# Patient Record
Sex: FEMALE | Race: WHITE | NOT HISPANIC OR LATINO
[De-identification: names, ages, dates, MRNs, and addresses within clinical notes are randomized per-mention and may not be internally consistent; named-entity substitution may affect disease eponyms.]

---

## 2017-01-13 ENCOUNTER — APPOINTMENT (OUTPATIENT)
Dept: UROGYNECOLOGY | Facility: CLINIC | Age: 42
End: 2017-01-13

## 2017-01-13 RX ORDER — CLARITHROMYCIN 500 MG/1
500 TABLET, FILM COATED ORAL
Qty: 20 | Refills: 0 | Status: DISCONTINUED | COMMUNITY
Start: 2016-12-16

## 2017-01-18 ENCOUNTER — APPOINTMENT (OUTPATIENT)
Dept: UROGYNECOLOGY | Facility: CLINIC | Age: 42
End: 2017-01-18

## 2017-01-31 ENCOUNTER — APPOINTMENT (OUTPATIENT)
Dept: UROGYNECOLOGY | Facility: CLINIC | Age: 42
End: 2017-01-31

## 2017-02-15 ENCOUNTER — APPOINTMENT (OUTPATIENT)
Dept: UROGYNECOLOGY | Facility: CLINIC | Age: 42
End: 2017-02-15

## 2017-03-15 ENCOUNTER — APPOINTMENT (OUTPATIENT)
Dept: UROGYNECOLOGY | Facility: CLINIC | Age: 42
End: 2017-03-15

## 2017-09-24 ENCOUNTER — TRANSCRIPTION ENCOUNTER (OUTPATIENT)
Age: 42
End: 2017-09-24

## 2018-01-30 ENCOUNTER — RESULT REVIEW (OUTPATIENT)
Age: 43
End: 2018-01-30

## 2018-11-07 ENCOUNTER — TRANSCRIPTION ENCOUNTER (OUTPATIENT)
Age: 43
End: 2018-11-07

## 2018-11-24 ENCOUNTER — TRANSCRIPTION ENCOUNTER (OUTPATIENT)
Age: 43
End: 2018-11-24

## 2019-01-10 ENCOUNTER — RESULT REVIEW (OUTPATIENT)
Age: 44
End: 2019-01-10

## 2019-02-08 ENCOUNTER — APPOINTMENT (OUTPATIENT)
Dept: UROGYNECOLOGY | Facility: CLINIC | Age: 44
End: 2019-02-08
Payer: COMMERCIAL

## 2019-02-08 DIAGNOSIS — R39.13 SPLITTING OF URINARY STREAM: ICD-10-CM

## 2019-02-08 DIAGNOSIS — N81.6 RECTOCELE: ICD-10-CM

## 2019-02-08 DIAGNOSIS — R15.0 INCOMPLETE DEFECATION: ICD-10-CM

## 2019-02-08 PROCEDURE — 99214 OFFICE O/P EST MOD 30 MIN: CPT | Mod: 25

## 2019-02-08 PROCEDURE — 51798 US URINE CAPACITY MEASURE: CPT

## 2019-02-08 NOTE — PHYSICAL EXAM
[No Acute Distress] : in no acute distress [Well developed] : well developed [Well Nourished] : ~L well nourished [Good Hygeine] : demonstrates good hygeine [Normal Memory] : ~T memory was ~L unimpaired [Normal Mood/Affect] : mood and affect are normal [Warm and Dry] : was warm and dry to touch [Normal Gait] : gait was normal [Labia Majora] : were normal [Labia Minora] : were normal [Normal Appearance] : general appearance was normal [No Bleeding] : there was no active vaginal bleeding [Aa ____] : Aa [unfilled] [Ba ____] : Ba [unfilled] [C ____] : C [unfilled] [GH ____] : GH [unfilled] [PB ____] : PB [unfilled] [TVL ____] : TVL  [unfilled] [Ap ____] : Ap [unfilled] [Bp ____] : Bp [unfilled] [D ____] : D [unfilled] [] : II [Normal] : normal [Uterine Adnexae] : were not tender and not enlarged [Anxiety] : patient is not anxious [Tenderness] : ~T no ~M abdominal tenderness observed [Distended] : not distended [H/Smegaly] : no hepatosplenomegaly [Inguinal LAD] : no adenopathy was noted in the inguinal lymph nodes [de-identified] : Rectocele to introitus extending down length of posterior vagina

## 2019-02-08 NOTE — HISTORY OF PRESENT ILLNESS
[Cystocele (Obstetric)] : daily [Uterine Prolapse] : daily [Rectal Prolapse] : daily [Unable To Restrain Bowel Movement] : rare [Urinary Frequency] : none [Feelings Of Urinary Urgency] : rare [Urinary Frequency More Than Twice At Night (Nocturia)] : none [Urinary Tract Infection] : rare [Constipation Obstructed Defecation] : none [Incomplete Emptying Of Stool] : none [] : months ago [Stool Visible Blood] : rare [FreeTextEntry3] : for BMs [de-identified] : cough, sneeze [de-identified] : splints [de-identified] : often splints for BMs but not always [FreeTextEntry1] : \par Tamia is a 42yo P2 who presents for evaluation for prolapse. Reports feeling pressure in the vagina and needing to splint occasionally for BMs for the past 6 months. Was previously seen for UUI, which has resolved. Does occasionally have urgency and ADONAY with cough and sneeze. This is not bothersome to her. Denies constipation and straining. 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

## 2019-02-08 NOTE — DISCUSSION/SUMMARY
[Discuss Alternatives/Risks/Benefits w/Patient] : All alternatives, risks, and benefits were discussed with the patient/family and all questions were answered.  Patient expressed good understanding and appreciates the importance of follow up as recommended. 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 Management options include continued observation, PFMT, pessary, and surgery. We discussed surgical options of a posterior repair alone with the associated risks of post-operative pain and dyspareunia. We discussed the findings of apical prolapse and recommended that if surgery is chosen, she have a comprehensive approach with supracervical hysterectomy, ASC, with possible posterior repair at time of surgery or later if necessary. Patient is uncertain at this time regarding surgery and would like to schedule an appointment for biofeedback/PFMT. She will consider surgical options. If she decided to move towards surgery, she will need to return for UDS testing due to h/o SVETLANA. She is unable to complete simple CMG in office due to difficult catheterization/discomfort demonstrated back in 2016 with her first visit. Pt expressed understanding, and all questions were answered.

## 2019-03-10 ENCOUNTER — TRANSCRIPTION ENCOUNTER (OUTPATIENT)
Age: 44
End: 2019-03-10

## 2019-03-22 ENCOUNTER — APPOINTMENT (OUTPATIENT)
Dept: UROGYNECOLOGY | Facility: CLINIC | Age: 44
End: 2019-03-22
Payer: COMMERCIAL

## 2019-03-22 DIAGNOSIS — R39.15 URGENCY OF URINATION: ICD-10-CM

## 2019-03-22 PROCEDURE — 99214 OFFICE O/P EST MOD 30 MIN: CPT

## 2019-04-05 ENCOUNTER — APPOINTMENT (OUTPATIENT)
Dept: UROGYNECOLOGY | Facility: CLINIC | Age: 44
End: 2019-04-05
Payer: COMMERCIAL

## 2019-04-05 DIAGNOSIS — N39.3 STRESS INCONTINENCE (FEMALE) (MALE): ICD-10-CM

## 2019-04-05 DIAGNOSIS — R33.9 RETENTION OF URINE, UNSPECIFIED: ICD-10-CM

## 2019-04-05 DIAGNOSIS — N81.2 INCOMPLETE UTEROVAGINAL PROLAPSE: ICD-10-CM

## 2019-04-05 DIAGNOSIS — N81.4 UTEROVAGINAL PROLAPSE, UNSPECIFIED: ICD-10-CM

## 2019-04-05 PROCEDURE — 99214 OFFICE O/P EST MOD 30 MIN: CPT

## 2019-05-06 ENCOUNTER — APPOINTMENT (OUTPATIENT)
Dept: UROGYNECOLOGY | Facility: CLINIC | Age: 44
End: 2019-05-06
Payer: COMMERCIAL

## 2019-05-06 DIAGNOSIS — N81.89 OTHER FEMALE GENITAL PROLAPSE: ICD-10-CM

## 2019-05-06 PROCEDURE — 99214 OFFICE O/P EST MOD 30 MIN: CPT

## 2019-06-17 ENCOUNTER — APPOINTMENT (OUTPATIENT)
Dept: UROGYNECOLOGY | Facility: CLINIC | Age: 44
End: 2019-06-17

## 2019-10-09 ENCOUNTER — APPOINTMENT (OUTPATIENT)
Dept: SURGICAL ONCOLOGY | Facility: CLINIC | Age: 44
End: 2019-10-09
Payer: COMMERCIAL

## 2019-10-09 VITALS
RESPIRATION RATE: 16 BRPM | SYSTOLIC BLOOD PRESSURE: 122 MMHG | BODY MASS INDEX: 20.4 KG/M2 | WEIGHT: 130 LBS | TEMPERATURE: 97.9 F | DIASTOLIC BLOOD PRESSURE: 79 MMHG | OXYGEN SATURATION: 100 % | HEART RATE: 57 BPM | HEIGHT: 67 IN

## 2019-10-09 DIAGNOSIS — N60.99 UNSPECIFIED BENIGN MAMMARY DYSPLASIA OF UNSPECIFIED BREAST: ICD-10-CM

## 2019-10-09 PROCEDURE — 99214 OFFICE O/P EST MOD 30 MIN: CPT

## 2019-10-09 RX ORDER — NITROFURANTOIN (MONOHYDRATE/MACROCRYSTALS) 25; 75 MG/1; MG/1
100 CAPSULE ORAL
Qty: 14 | Refills: 0 | Status: ACTIVE | COMMUNITY
Start: 2019-07-26

## 2019-12-17 ENCOUNTER — FORM ENCOUNTER (OUTPATIENT)
Age: 44
End: 2019-12-17

## 2019-12-18 ENCOUNTER — APPOINTMENT (OUTPATIENT)
Dept: MRI IMAGING | Facility: IMAGING CENTER | Age: 44
End: 2019-12-18
Payer: COMMERCIAL

## 2019-12-18 ENCOUNTER — OUTPATIENT (OUTPATIENT)
Dept: OUTPATIENT SERVICES | Facility: HOSPITAL | Age: 44
LOS: 1 days | End: 2019-12-18
Payer: COMMERCIAL

## 2019-12-18 DIAGNOSIS — Z91.89 OTHER SPECIFIED PERSONAL RISK FACTORS, NOT ELSEWHERE CLASSIFIED: ICD-10-CM

## 2019-12-18 PROCEDURE — C8908: CPT

## 2019-12-18 PROCEDURE — C8937: CPT

## 2019-12-18 PROCEDURE — A9585: CPT

## 2019-12-18 PROCEDURE — 77049 MRI BREAST C-+ W/CAD BI: CPT | Mod: 26

## 2020-01-06 NOTE — REASON FOR VISIT
[Follow-Up Visit] : a follow-up visit for [Other: _____] : [unfilled] [FreeTextEntry2] : interval breast exam

## 2020-01-06 NOTE — HISTORY OF PRESENT ILLNESS
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 Dominic ARANDA\par \par No significant past medical history.\par No prescription medications.\par \par Her gynecologist is Dr. Shilpa CENTENO\par January 2019 Pap smear was unremarkable\par Her on genetic testing ([employer]) identified no mutations which create a posterior breast or ovarian cancer

## 2020-01-06 NOTE — PHYSICAL EXAM
[Normal] : supple, no neck mass and thyroid not enlarged [Normal Neck Lymph Nodes] : normal neck lymph nodes  [Normal Supraclavicular Lymph Nodes] : normal supraclavicular lymph nodes [Normal Axillary Lymph Nodes] : normal axillary lymph nodes [Normal] : normal appearance, no rash, nodules, vesicles, ulcers, erythema [de-identified] : groins not examined [de-identified] : Below

## 2020-01-06 NOTE — ASSESSMENT
[FreeTextEntry1] : July 2019 bilateral mammogram and sonogram @[facility]: BI-RADS-1\par Annual followup recommended, prescription provided for July 2020.\par \par Introduced the suggestion of a baseline breast MRI, breast cancer risk score: 24.4\par She agrees, we'll perform baseline in December 2019, prescription entered\par \par Clinically appears to be doing well.\par \par We will continue annual followup, sooner if needed\par \par \par 12-18-19.\par I called her but had to leave a voice mail.\par Her baseline breast MRI performed earlier today at [facility]: BI-RADS 3.\par Equivocal right breast findings, likely corresponding to a stable nodule seen on previous ultrasound for which 6 month followup is recommended in June 2020, prescription entered today\par \par 12-19-19.\par I returned her call but had to leave a voicemail.

## 2020-06-19 ENCOUNTER — RESULT REVIEW (OUTPATIENT)
Age: 45
End: 2020-06-19

## 2020-06-19 ENCOUNTER — APPOINTMENT (OUTPATIENT)
Dept: MRI IMAGING | Facility: IMAGING CENTER | Age: 45
End: 2020-06-19
Payer: COMMERCIAL

## 2020-06-19 ENCOUNTER — OUTPATIENT (OUTPATIENT)
Dept: OUTPATIENT SERVICES | Facility: HOSPITAL | Age: 45
LOS: 1 days | End: 2020-06-19
Payer: COMMERCIAL

## 2020-06-19 DIAGNOSIS — Z91.89 OTHER SPECIFIED PERSONAL RISK FACTORS, NOT ELSEWHERE CLASSIFIED: ICD-10-CM

## 2020-06-19 PROCEDURE — C8908: CPT

## 2020-06-19 PROCEDURE — C8937: CPT

## 2020-06-19 PROCEDURE — 77049 MRI BREAST C-+ W/CAD BI: CPT | Mod: 26

## 2020-06-19 PROCEDURE — A9585: CPT

## 2020-07-23 ENCOUNTER — RESULT REVIEW (OUTPATIENT)
Age: 45
End: 2020-07-23

## 2020-10-05 ENCOUNTER — APPOINTMENT (OUTPATIENT)
Dept: SURGICAL ONCOLOGY | Facility: CLINIC | Age: 45
End: 2020-10-05

## 2020-10-05 NOTE — ASSESSMENT
[FreeTextEntry1] : December 2019:\par Baseline breast MRI: BI-RADS 3.\par \par June 2020:\par Follow-up breast MRI at 450: BI-RADS 2.\par We will repeat periodically, balancing her increased risk of breast cancer, against the concern of cumulative gadolinium exposure.\par \par July 2020 mammogram and sonogram at [facility].................. \par \par 10/5/20, she did not keep her appointment for followup of her Increased risk of breast cancer

## 2020-10-05 NOTE — HISTORY OF PRESENT ILLNESS
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 Dominic ARANDA\par \par No significant past medical history.\par No prescription medications.\par \par \par Her gynecologist is Dr. Shilpa CENTENO\par January 2019 Pap smear was unremarkable\par Her genetic testing ([employer]) identified no mutations which would predispose her to breast or ovarian cancer\par \par \par Colonoscopy will be at age 52

## 2020-10-05 NOTE — PHYSICAL EXAM
[Normal] : supple, no neck mass and thyroid not enlarged [Normal Neck Lymph Nodes] : normal neck lymph nodes  [Normal Supraclavicular Lymph Nodes] : normal supraclavicular lymph nodes [Normal Axillary Lymph Nodes] : normal axillary lymph nodes [Normal] : normal appearance, no rash, nodules, vesicles, ulcers, erythema [de-identified] : groins not examined [de-identified] : Below

## 2021-01-20 ENCOUNTER — RESULT REVIEW (OUTPATIENT)
Age: 46
End: 2021-01-20

## 2021-01-20 ENCOUNTER — APPOINTMENT (OUTPATIENT)
Dept: MAMMOGRAPHY | Facility: HOSPITAL | Age: 46
End: 2021-01-20
Payer: COMMERCIAL

## 2021-01-20 ENCOUNTER — APPOINTMENT (OUTPATIENT)
Dept: ULTRASOUND IMAGING | Facility: HOSPITAL | Age: 46
End: 2021-01-20
Payer: COMMERCIAL

## 2021-01-20 ENCOUNTER — OUTPATIENT (OUTPATIENT)
Dept: OUTPATIENT SERVICES | Facility: HOSPITAL | Age: 46
LOS: 1 days | End: 2021-01-20
Payer: COMMERCIAL

## 2021-01-20 DIAGNOSIS — Z00.8 ENCOUNTER FOR OTHER GENERAL EXAMINATION: ICD-10-CM

## 2021-01-20 PROCEDURE — 76641 ULTRASOUND BREAST COMPLETE: CPT | Mod: 26,50

## 2021-01-20 PROCEDURE — 77067 SCR MAMMO BI INCL CAD: CPT | Mod: 26

## 2021-01-20 PROCEDURE — 77063 BREAST TOMOSYNTHESIS BI: CPT | Mod: 26

## 2021-01-20 PROCEDURE — 76641 ULTRASOUND BREAST COMPLETE: CPT

## 2021-01-20 PROCEDURE — 77067 SCR MAMMO BI INCL CAD: CPT

## 2021-01-20 PROCEDURE — 77063 BREAST TOMOSYNTHESIS BI: CPT

## 2021-02-02 ENCOUNTER — APPOINTMENT (OUTPATIENT)
Dept: ULTRASOUND IMAGING | Facility: IMAGING CENTER | Age: 46
End: 2021-02-02

## 2021-02-02 ENCOUNTER — APPOINTMENT (OUTPATIENT)
Dept: MAMMOGRAPHY | Facility: IMAGING CENTER | Age: 46
End: 2021-02-02

## 2021-02-25 ENCOUNTER — TRANSCRIPTION ENCOUNTER (OUTPATIENT)
Age: 46
End: 2021-02-25

## 2021-03-04 ENCOUNTER — APPOINTMENT (OUTPATIENT)
Dept: OBGYN | Facility: CLINIC | Age: 46
End: 2021-03-04
Payer: COMMERCIAL

## 2021-03-04 DIAGNOSIS — R61 GENERALIZED HYPERHIDROSIS: ICD-10-CM

## 2021-03-04 PROCEDURE — 99442: CPT | Mod: 25,95

## 2021-04-08 ENCOUNTER — NON-APPOINTMENT (OUTPATIENT)
Age: 46
End: 2021-04-08

## 2021-04-08 DIAGNOSIS — R51.9 HEADACHE, UNSPECIFIED: ICD-10-CM

## 2021-04-08 LAB
BASOPHILS # BLD AUTO: 0.04 K/UL
BASOPHILS NFR BLD AUTO: 0.7 %
EOSINOPHIL # BLD AUTO: 0.32 K/UL
EOSINOPHIL NFR BLD AUTO: 5.7 %
ESTIMATED AVERAGE GLUCOSE: 103 MG/DL
ESTRADIOL SERPL-MCNC: <5 PG/ML
FSH SERPL-MCNC: 4.5 IU/L
HBA1C MFR BLD HPLC: 5.2 %
HCT VFR BLD CALC: 43.1 %
HGB BLD-MCNC: 13.8 G/DL
IMM GRANULOCYTES NFR BLD AUTO: 0.2 %
LH SERPL-ACNC: 4.2 IU/L
LYMPHOCYTES # BLD AUTO: 1.96 K/UL
LYMPHOCYTES NFR BLD AUTO: 35.1 %
MAN DIFF?: NORMAL
MCHC RBC-ENTMCNC: 31.9 PG
MCHC RBC-ENTMCNC: 32 GM/DL
MCV RBC AUTO: 99.8 FL
MONOCYTES # BLD AUTO: 0.37 K/UL
MONOCYTES NFR BLD AUTO: 6.6 %
NEUTROPHILS # BLD AUTO: 2.88 K/UL
NEUTROPHILS NFR BLD AUTO: 51.7 %
PLATELET # BLD AUTO: 222 K/UL
RBC # BLD: 4.32 M/UL
RBC # FLD: 12.7 %
TSH SERPL-ACNC: 2.14 UIU/ML
WBC # FLD AUTO: 5.58 K/UL

## 2021-04-11 LAB
ALBUMIN SERPL ELPH-MCNC: 4 G/DL
ALP BLD-CCNC: 51 U/L
ALT SERPL-CCNC: 13 U/L
ANION GAP SERPL CALC-SCNC: 18 MMOL/L
AST SERPL-CCNC: 23 U/L
BILIRUB SERPL-MCNC: 0.4 MG/DL
BUN SERPL-MCNC: 12 MG/DL
CALCIUM SERPL-MCNC: 9.6 MG/DL
CHLORIDE SERPL-SCNC: 105 MMOL/L
CO2 SERPL-SCNC: 20 MMOL/L
CREAT SERPL-MCNC: 0.81 MG/DL
GLUCOSE SERPL-MCNC: 83 MG/DL
POTASSIUM SERPL-SCNC: 4.7 MMOL/L
PROT SERPL-MCNC: 6.8 G/DL
SODIUM SERPL-SCNC: 143 MMOL/L

## 2021-05-10 ENCOUNTER — RX RENEWAL (OUTPATIENT)
Age: 46
End: 2021-05-10

## 2021-05-10 RX ORDER — ESTRADIOL 1 MG/1
1 TABLET ORAL
Qty: 30 | Refills: 0 | Status: ACTIVE | COMMUNITY
Start: 2021-04-08 | End: 1900-01-01

## 2021-06-23 DIAGNOSIS — Z30.41 ENCOUNTER FOR SURVEILLANCE OF CONTRACEPTIVE PILLS: ICD-10-CM

## 2021-06-23 RX ORDER — NORGESTIMATE AND ETHINYL ESTRADIOL 0.25-0.035
0.25-35 KIT ORAL
Qty: 1 | Refills: 0 | Status: ACTIVE | COMMUNITY
Start: 2019-01-30 | End: 1900-01-01

## 2021-08-04 ENCOUNTER — RX RENEWAL (OUTPATIENT)
Age: 46
End: 2021-08-04

## 2021-08-06 ENCOUNTER — APPOINTMENT (OUTPATIENT)
Dept: OBGYN | Facility: CLINIC | Age: 46
End: 2021-08-06

## 2021-09-02 RX ORDER — NORGESTIMATE AND ETHINYL ESTRADIOL 0.25-0.035
0.25-35 KIT ORAL
Qty: 28 | Refills: 0 | Status: ACTIVE | COMMUNITY
Start: 2021-08-04 | End: 1900-01-01

## 2021-09-21 ENCOUNTER — APPOINTMENT (OUTPATIENT)
Dept: OBGYN | Facility: CLINIC | Age: 46
End: 2021-09-21
Payer: COMMERCIAL

## 2021-09-21 VITALS
SYSTOLIC BLOOD PRESSURE: 108 MMHG | WEIGHT: 134 LBS | BODY MASS INDEX: 21.03 KG/M2 | HEIGHT: 67 IN | DIASTOLIC BLOOD PRESSURE: 70 MMHG

## 2021-09-21 DIAGNOSIS — G43.909 MIGRAINE, UNSPECIFIED, NOT INTRACTABLE, W/OUT STATUS MIGRAINOSUS: ICD-10-CM

## 2021-09-21 DIAGNOSIS — Z30.9 ENCOUNTER FOR CONTRACEPTIVE MANAGEMENT, UNSPECIFIED: ICD-10-CM

## 2021-09-21 PROCEDURE — 99396 PREV VISIT EST AGE 40-64: CPT

## 2021-09-21 PROCEDURE — 82270 OCCULT BLOOD FECES: CPT

## 2021-09-21 RX ORDER — LACTIC ACID, L-, CITRIC ACID MONOHYDRATE, AND POTASSIUM BITARTRATE 90; 50; 20 MG/5G; MG/5G; MG/5G
1.8-1-0.4 GEL VAGINAL
Qty: 1 | Refills: 3 | Status: ACTIVE | COMMUNITY
Start: 2021-09-21 | End: 1900-01-01

## 2021-09-24 LAB — HPV HIGH+LOW RISK DNA PNL CVX: NOT DETECTED

## 2021-09-27 LAB — CYTOLOGY CVX/VAG DOC THIN PREP: NORMAL

## 2021-11-14 DIAGNOSIS — R39.9 UNSPECIFIED SYMPTOMS AND SIGNS INVOLVING THE GENITOURINARY SYSTEM: ICD-10-CM

## 2021-11-14 RX ORDER — NITROFURANTOIN (MONOHYDRATE/MACROCRYSTALS) 25; 75 MG/1; MG/1
100 CAPSULE ORAL
Qty: 10 | Refills: 0 | Status: ACTIVE | COMMUNITY
Start: 2021-11-14 | End: 1900-01-01

## 2022-02-11 ENCOUNTER — RESULT REVIEW (OUTPATIENT)
Age: 47
End: 2022-02-11

## 2022-02-11 ENCOUNTER — APPOINTMENT (OUTPATIENT)
Dept: MAMMOGRAPHY | Facility: CLINIC | Age: 47
End: 2022-02-11
Payer: COMMERCIAL

## 2022-02-11 ENCOUNTER — APPOINTMENT (OUTPATIENT)
Dept: ULTRASOUND IMAGING | Facility: CLINIC | Age: 47
End: 2022-02-11
Payer: COMMERCIAL

## 2022-02-11 PROCEDURE — 76641 ULTRASOUND BREAST COMPLETE: CPT | Mod: 50

## 2022-02-11 PROCEDURE — 77067 SCR MAMMO BI INCL CAD: CPT

## 2022-02-11 PROCEDURE — 77063 BREAST TOMOSYNTHESIS BI: CPT

## 2023-01-18 ENCOUNTER — APPOINTMENT (OUTPATIENT)
Dept: OBGYN | Facility: CLINIC | Age: 48
End: 2023-01-18
Payer: COMMERCIAL

## 2023-01-18 VITALS
WEIGHT: 129 LBS | DIASTOLIC BLOOD PRESSURE: 65 MMHG | BODY MASS INDEX: 20.25 KG/M2 | HEIGHT: 67 IN | SYSTOLIC BLOOD PRESSURE: 111 MMHG

## 2023-01-18 PROCEDURE — 82270 OCCULT BLOOD FECES: CPT

## 2023-01-18 PROCEDURE — 99396 PREV VISIT EST AGE 40-64: CPT

## 2023-01-23 LAB
CYTOLOGY CVX/VAG DOC THIN PREP: NORMAL
HPV HIGH+LOW RISK DNA PNL CVX: NOT DETECTED

## 2023-01-26 DIAGNOSIS — R92.2 INCONCLUSIVE MAMMOGRAM: ICD-10-CM

## 2023-02-15 ENCOUNTER — APPOINTMENT (OUTPATIENT)
Dept: ULTRASOUND IMAGING | Facility: CLINIC | Age: 48
End: 2023-02-15
Payer: COMMERCIAL

## 2023-02-15 ENCOUNTER — APPOINTMENT (OUTPATIENT)
Dept: MAMMOGRAPHY | Facility: CLINIC | Age: 48
End: 2023-02-15
Payer: COMMERCIAL

## 2023-02-15 ENCOUNTER — RESULT REVIEW (OUTPATIENT)
Age: 48
End: 2023-02-15

## 2023-02-15 PROCEDURE — 76641 ULTRASOUND BREAST COMPLETE: CPT | Mod: 50

## 2023-02-15 PROCEDURE — 77063 BREAST TOMOSYNTHESIS BI: CPT

## 2023-02-15 PROCEDURE — 77067 SCR MAMMO BI INCL CAD: CPT

## 2023-02-17 ENCOUNTER — OUTPATIENT (OUTPATIENT)
Dept: OUTPATIENT SERVICES | Facility: HOSPITAL | Age: 48
LOS: 1 days | End: 2023-02-17
Payer: COMMERCIAL

## 2023-02-17 ENCOUNTER — APPOINTMENT (OUTPATIENT)
Dept: MRI IMAGING | Facility: IMAGING CENTER | Age: 48
End: 2023-02-17
Payer: COMMERCIAL

## 2023-02-17 DIAGNOSIS — R92.2 INCONCLUSIVE MAMMOGRAM: ICD-10-CM

## 2023-02-17 DIAGNOSIS — Z00.8 ENCOUNTER FOR OTHER GENERAL EXAMINATION: ICD-10-CM

## 2023-02-17 DIAGNOSIS — Z01.419 ENCOUNTER FOR GYNECOLOGICAL EXAMINATION (GENERAL) (ROUTINE) WITHOUT ABNORMAL FINDINGS: ICD-10-CM

## 2023-02-17 PROCEDURE — A9585: CPT

## 2023-02-17 PROCEDURE — 77049 MRI BREAST C-+ W/CAD BI: CPT | Mod: 26

## 2023-02-17 PROCEDURE — C8937: CPT

## 2023-02-17 PROCEDURE — C8908: CPT

## 2023-02-28 ENCOUNTER — NON-APPOINTMENT (OUTPATIENT)
Age: 48
End: 2023-02-28

## 2023-08-01 NOTE — REASON FOR VISIT
[Other: _____] : [unfilled] [FreeTextEntry2] : 10/5/20, she did not keep her appointment for followup of her Increased risk of breast cancer - - -

## 2024-01-23 ENCOUNTER — NON-APPOINTMENT (OUTPATIENT)
Age: 49
End: 2024-01-23

## 2024-02-01 ENCOUNTER — NON-APPOINTMENT (OUTPATIENT)
Age: 49
End: 2024-02-01

## 2024-02-04 PROBLEM — Z91.89 INCREASED RISK OF BREAST CANCER: Status: ACTIVE | Noted: 2019-10-09

## 2024-02-05 ENCOUNTER — APPOINTMENT (OUTPATIENT)
Dept: SURGICAL ONCOLOGY | Facility: CLINIC | Age: 49
End: 2024-02-05
Payer: COMMERCIAL

## 2024-02-05 ENCOUNTER — NON-APPOINTMENT (OUTPATIENT)
Age: 49
End: 2024-02-05

## 2024-02-05 VITALS
WEIGHT: 128 LBS | SYSTOLIC BLOOD PRESSURE: 115 MMHG | RESPIRATION RATE: 16 BRPM | HEIGHT: 67 IN | OXYGEN SATURATION: 96 % | DIASTOLIC BLOOD PRESSURE: 75 MMHG | HEART RATE: 68 BPM | BODY MASS INDEX: 20.09 KG/M2

## 2024-02-05 DIAGNOSIS — Z91.89 OTHER SPECIFIED PERSONAL RISK FACTORS, NOT ELSEWHERE CLASSIFIED: ICD-10-CM

## 2024-02-05 PROCEDURE — 99204 OFFICE O/P NEW MOD 45 MIN: CPT

## 2024-02-05 NOTE — REASON FOR VISIT
[Other: _____] : [unfilled] [FreeTextEntry2] : Reestablish follow-up for increased risk of breast cancer, Tyrer-Cuzick risk score = 39.3

## 2024-02-05 NOTE — HISTORY OF PRESENT ILLNESS
[de-identified] : 48-year-old lady.  She last saw me in October 2019.  We have followed her intermittently for periodic breast examinations since November 2009.  Tyrer-Cuzick risk score = 39.3.  CC: Presently, no signs or symptoms related to either breast.   Had her second baby November 2016. The baby was diagnosed, at birth, as having a deleterious mutation for ALD (adrenal leukodystrophy). He is presently growing and maturing normally and is followed semiannually in [place] ([facility]) with regular brain MRIs. 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 Her neurologist here is Dr. Sobeida ARRIOLA. She often follows up with Dr. Laughlin in [place].   No previous breast biopsies  Her menarche was at age 16. Two pregnancies, both delivered, the first at age 37.  +FH: Her maternal grandmother had breast cancer. TWO maternal great aunts had breast cancer also.  No relatives with ovarian cancer.  Not Ashkenazi.  + Additional family history of malignancy: Mother: AML.   Breast cancer risk score, calculated October 9, 2019: 24.4   PMD: Dr. Giselle DIEHL. She used to see Dr. Dominic Henderson.  + ALLERGIC: Penicillin.  No significant past medical history.  She takes Wellbutrin and Celexa. Prescribed by Dr. Kwasi FROST.   Her gynecologist is Dr. Shilpa CENTENO January 2023 visit was unremarkable.  Her genetic testing ([employer]) identified no mutations which would predispose her to breast or ovarian cancer   Appointment for baseline colonoscopy is pending with Dr. Maya PRESLEY.

## 2024-02-05 NOTE — ASSESSMENT
[FreeTextEntry1] : 48-year-old lady being seen to reestablish follow-up for increased risk of breast cancer.  Tyrer-Cuzick risk score = 39.3.  February 2023: Bilateral mammogram and sonogram at [facility]: BI-RADS 2. Prescription provided today for February 2024.  February 2023: Bilateral breast : BI-RADS 2. Prescription entered/provided today for August 2024.  In each instance I have asked her to call me a week after the imaging to discuss the results.  If asymptomatic, with normal imaging, I have offered to continue to see her annually, sooner if needed.  Reviewed in detail, all questions answered.

## 2024-02-05 NOTE — PHYSICAL EXAM
[Normal] : supple, no neck mass and thyroid not enlarged [Normal Neck Lymph Nodes] : normal neck lymph nodes  [Normal Supraclavicular Lymph Nodes] : normal supraclavicular lymph nodes [Normal Axillary Lymph Nodes] : normal axillary lymph nodes [Normal] : normal appearance, no rash, nodules, vesicles, ulcers, erythema [de-identified] : groins not examined [de-identified] : Below

## 2024-03-14 ENCOUNTER — RESULT REVIEW (OUTPATIENT)
Age: 49
End: 2024-03-14

## 2024-03-14 ENCOUNTER — APPOINTMENT (OUTPATIENT)
Dept: ULTRASOUND IMAGING | Facility: CLINIC | Age: 49
End: 2024-03-14
Payer: COMMERCIAL

## 2024-03-14 ENCOUNTER — APPOINTMENT (OUTPATIENT)
Dept: MAMMOGRAPHY | Facility: CLINIC | Age: 49
End: 2024-03-14
Payer: COMMERCIAL

## 2024-03-14 PROCEDURE — 77067 SCR MAMMO BI INCL CAD: CPT

## 2024-03-14 PROCEDURE — 76641 ULTRASOUND BREAST COMPLETE: CPT | Mod: 50

## 2024-03-14 PROCEDURE — 77063 BREAST TOMOSYNTHESIS BI: CPT

## 2024-03-18 ENCOUNTER — TRANSCRIPTION ENCOUNTER (OUTPATIENT)
Age: 49
End: 2024-03-18

## 2024-03-18 ENCOUNTER — APPOINTMENT (OUTPATIENT)
Dept: OBGYN | Facility: CLINIC | Age: 49
End: 2024-03-18
Payer: COMMERCIAL

## 2024-03-18 VITALS
HEIGHT: 67 IN | DIASTOLIC BLOOD PRESSURE: 73 MMHG | BODY MASS INDEX: 19.78 KG/M2 | SYSTOLIC BLOOD PRESSURE: 109 MMHG | WEIGHT: 126 LBS

## 2024-03-18 DIAGNOSIS — N95.1 MENOPAUSAL AND FEMALE CLIMACTERIC STATES: ICD-10-CM

## 2024-03-18 DIAGNOSIS — Z01.419 ENCOUNTER FOR GYNECOLOGICAL EXAMINATION (GENERAL) (ROUTINE) W/OUT ABNORMAL FINDINGS: ICD-10-CM

## 2024-03-18 PROCEDURE — 82270 OCCULT BLOOD FECES: CPT

## 2024-03-18 PROCEDURE — 99396 PREV VISIT EST AGE 40-64: CPT

## 2024-03-18 NOTE — PHYSICAL EXAM
[Chaperone Present] : A chaperone was present in the examining room during all aspects of the physical examination [Alert] : alert [Appropriately responsive] : appropriately responsive [No Acute Distress] : no acute distress [No Lymphadenopathy] : no lymphadenopathy [Regular Rate Rhythm] : regular rate rhythm [No Murmurs] : no murmurs [Clear to Auscultation B/L] : clear to auscultation bilaterally [Non-tender] : non-tender [Soft] : soft [Non-distended] : non-distended [No HSM] : No HSM [No Lesions] : no lesions [No Mass] : no mass [Oriented x3] : oriented x3 [Examination Of The Breasts] : a normal appearance [No Masses] : no breast masses were palpable [Labia Majora] : normal [Labia Minora] : normal [Normal] : normal [Uterine Adnexae] : normal [FreeTextEntry9] : Guaiac test negative, no masses noted

## 2024-03-18 NOTE — PLAN
[FreeTextEntry1] : Health Maintenance: BSA, calcium, vitamin D and exercise d/w pt Pap/HPV conducted  Advised pt to schedule colonoscopy Advised pt to see PCP and dermatologist annually  Abnormal uterine bleeding: Likely perimenopausal anovulatory bleeding RTO for pelvic sono, sonohysterogram. If no polyps are found, then pt to get endometrial biopsy.  FSH, TSH Free T4 drawn today.   FHx of breast ca,: Pt to schedule breast MRI.  Hs Rxs  Perimenopausal Symptoms: D/w pt Calcium and Vit. D intake,  weight bearing exercises.   RTO PRN or for annual gyn exam

## 2024-03-18 NOTE — COUNSELING
[Vitamins/Supplements] : vitamins/supplements [Nutrition/ Exercise/ Weight Management] : nutrition, exercise, weight management [Drugs/Alcohol] : drugs, alcohol [Breast Self Exam] : breast self exam

## 2024-03-18 NOTE — HISTORY OF PRESENT ILLNESS
[FreeTextEntry1] : 2024. AP DOE 48 year old female  LMP 3/13/2024 She presents for an annual gyn exam.  She reports monthly menses (has had 2 bleeds in one month, last happened 6 months ago), not too heavy, not painful. She reports infrequent hot flashes and night sweats.   She denies abdominal and pelvic pain, no vaginal discharge or vaginitis symptoms. She reports normal urination, no dysuria, no incontinence of urine. BM is normal per patient, no blood in stool or constipation/diarrhea.  She follows-up w/ Dr. Hinojosa for FHx of breast ca. increased risk score, last saw him 2024. Pt reports a L breast skin tag that she would like removed.  She follows-up with PCP and dermatology yearly.  She is currently sexually active, uses ovulation kits. Does not desire contraception  GynHx: Remote hx abnl pap. No Hx of STI, fibroids, ovarian cysts, or pelvic infections. PMH: Anxiety, ALD. Increased TC score, [facility] genetics panel was negative Meds: Celexa 20mg (qd), Wellbutrin 150mg (qd) OBHx:    Claribel; PPD; 2017 37wk induction cholestasis and Aron (has Adrenoleukodystrophy- doing well) SHx: laser of cervix FHx: Mother - leukemia at 50 yrs, passed at 52 yrs. Maternal grandmother age 50 breast cancer, maternal great aunts x2- Breast ca. Denies FHx of ovarian, uterine, colon, pancreatic, prostate cancer. All: PCN (rash) Social: Former smoker (quit ). No alcohol, drug, or tobacco use.      [TextBox_4] : (2/2023) Breast MRI: BIRADS 2  [Mammogramdate] : 3/2024 [TextBox_19] : TC score: 38.9, BIRADS 2  [TextBox_25] : BIRADS 2  [BreastSonogramDate] : 3/2024 [PapSmeardate] : 1/2023 [TextBox_31] : NILM, HPV not detected.  [No] : Patient does not have concerns regarding sex [Currently Active] : currently active

## 2024-03-21 LAB
CYTOLOGY CVX/VAG DOC THIN PREP: NORMAL
ESTRADIOL SERPL-MCNC: 85 PG/ML
FSH SERPL-MCNC: 17.2 IU/L
HPV HIGH+LOW RISK DNA PNL CVX: NOT DETECTED
LH SERPL-ACNC: 8.1 IU/L
T4 FREE SERPL-MCNC: 1.2 NG/DL
TSH SERPL-ACNC: 1.23 UIU/ML

## 2024-03-22 ENCOUNTER — NON-APPOINTMENT (OUTPATIENT)
Age: 49
End: 2024-03-22

## 2024-04-03 ENCOUNTER — ASOB RESULT (OUTPATIENT)
Age: 49
End: 2024-04-03

## 2024-04-03 ENCOUNTER — APPOINTMENT (OUTPATIENT)
Dept: OBGYN | Facility: CLINIC | Age: 49
End: 2024-04-03
Payer: COMMERCIAL

## 2024-04-03 PROCEDURE — 76830 TRANSVAGINAL US NON-OB: CPT

## 2024-04-18 ENCOUNTER — APPOINTMENT (OUTPATIENT)
Dept: OBGYN | Facility: CLINIC | Age: 49
End: 2024-04-18

## 2024-04-19 ENCOUNTER — APPOINTMENT (OUTPATIENT)
Dept: GASTROENTEROLOGY | Facility: CLINIC | Age: 49
End: 2024-04-19
Payer: COMMERCIAL

## 2024-04-19 DIAGNOSIS — Z12.11 ENCOUNTER FOR SCREENING FOR MALIGNANT NEOPLASM OF COLON: ICD-10-CM

## 2024-04-19 PROCEDURE — 99202 OFFICE O/P NEW SF 15 MIN: CPT

## 2024-04-19 RX ORDER — SODIUM PICOSULFATE, MAGNESIUM OXIDE, AND ANHYDROUS CITRIC ACID 12; 3.5; 1 G/175ML; G/175ML; MG/175ML
10-3.5-12 MG-GM LIQUID ORAL
Qty: 2 | Refills: 0 | Status: ACTIVE | COMMUNITY
Start: 2024-04-19 | End: 1900-01-01

## 2024-05-22 ENCOUNTER — APPOINTMENT (OUTPATIENT)
Dept: OBGYN | Facility: CLINIC | Age: 49
End: 2024-05-22
Payer: COMMERCIAL

## 2024-05-22 ENCOUNTER — ASOB RESULT (OUTPATIENT)
Age: 49
End: 2024-05-22

## 2024-05-22 DIAGNOSIS — N93.9 ABNORMAL UTERINE AND VAGINAL BLEEDING, UNSPECIFIED: ICD-10-CM

## 2024-05-22 LAB — HCG UR QL: NEGATIVE

## 2024-05-22 PROCEDURE — 76831 ECHO EXAM UTERUS: CPT | Mod: 59

## 2024-05-22 PROCEDURE — 58340 CATHETER FOR HYSTEROGRAPHY: CPT

## 2024-05-22 PROCEDURE — 81025 URINE PREGNANCY TEST: CPT

## 2024-05-22 PROCEDURE — ZZZZZ: CPT

## 2024-06-21 ENCOUNTER — APPOINTMENT (OUTPATIENT)
Dept: GASTROENTEROLOGY | Facility: AMBULATORY MEDICAL SERVICES | Age: 49
End: 2024-06-21
Payer: COMMERCIAL

## 2024-06-21 PROCEDURE — 45385 COLONOSCOPY W/LESION REMOVAL: CPT | Mod: 33

## 2024-08-06 ENCOUNTER — OUTPATIENT (OUTPATIENT)
Dept: OUTPATIENT SERVICES | Facility: HOSPITAL | Age: 49
LOS: 1 days | End: 2024-08-06
Payer: COMMERCIAL

## 2024-08-06 ENCOUNTER — APPOINTMENT (OUTPATIENT)
Dept: MRI IMAGING | Facility: CLINIC | Age: 49
End: 2024-08-06

## 2024-08-06 DIAGNOSIS — Z91.89 OTHER SPECIFIED PERSONAL RISK FACTORS, NOT ELSEWHERE CLASSIFIED: ICD-10-CM

## 2024-08-06 PROCEDURE — A9585: CPT

## 2024-08-06 PROCEDURE — C8937: CPT

## 2024-08-06 PROCEDURE — 77049 MRI BREAST C-+ W/CAD BI: CPT | Mod: 26

## 2024-08-06 PROCEDURE — C8908: CPT

## 2024-12-21 DIAGNOSIS — R39.9 UNSPECIFIED SYMPTOMS AND SIGNS INVOLVING THE GENITOURINARY SYSTEM: ICD-10-CM

## 2024-12-21 RX ORDER — NITROFURANTOIN (MONOHYDRATE/MACROCRYSTALS) 25; 75 MG/1; MG/1
100 CAPSULE ORAL
Qty: 10 | Refills: 0 | Status: ACTIVE | COMMUNITY
Start: 2024-12-21 | End: 1900-01-01

## 2024-12-21 RX ORDER — PHENAZOPYRIDINE 200 MG/1
200 TABLET, FILM COATED ORAL 3 TIMES DAILY
Qty: 6 | Refills: 0 | Status: ACTIVE | COMMUNITY
Start: 2024-12-21 | End: 1900-01-01

## 2025-03-19 DIAGNOSIS — N60.99 UNSPECIFIED BENIGN MAMMARY DYSPLASIA OF UNSPECIFIED BREAST: ICD-10-CM

## 2025-04-03 ENCOUNTER — APPOINTMENT (OUTPATIENT)
Dept: MAMMOGRAPHY | Facility: CLINIC | Age: 50
End: 2025-04-03
Payer: COMMERCIAL

## 2025-04-03 ENCOUNTER — RESULT REVIEW (OUTPATIENT)
Age: 50
End: 2025-04-03

## 2025-04-03 ENCOUNTER — APPOINTMENT (OUTPATIENT)
Dept: ULTRASOUND IMAGING | Facility: CLINIC | Age: 50
End: 2025-04-03
Payer: COMMERCIAL

## 2025-04-03 PROCEDURE — 77063 BREAST TOMOSYNTHESIS BI: CPT

## 2025-04-03 PROCEDURE — 76641 ULTRASOUND BREAST COMPLETE: CPT | Mod: 50

## 2025-04-03 PROCEDURE — 77067 SCR MAMMO BI INCL CAD: CPT

## 2025-04-05 ENCOUNTER — NON-APPOINTMENT (OUTPATIENT)
Age: 50
End: 2025-04-05

## 2025-04-07 ENCOUNTER — APPOINTMENT (OUTPATIENT)
Dept: SURGICAL ONCOLOGY | Facility: CLINIC | Age: 50
End: 2025-04-07

## 2025-04-07 DIAGNOSIS — Z91.89 OTHER SPECIFIED PERSONAL RISK FACTORS, NOT ELSEWHERE CLASSIFIED: ICD-10-CM

## 2025-04-30 ENCOUNTER — APPOINTMENT (OUTPATIENT)
Dept: OBGYN | Facility: CLINIC | Age: 50
End: 2025-04-30
Payer: COMMERCIAL

## 2025-04-30 VITALS
DIASTOLIC BLOOD PRESSURE: 67 MMHG | BODY MASS INDEX: 18.99 KG/M2 | WEIGHT: 121 LBS | SYSTOLIC BLOOD PRESSURE: 105 MMHG | HEIGHT: 67 IN

## 2025-04-30 DIAGNOSIS — Z01.419 ENCOUNTER FOR GYNECOLOGICAL EXAMINATION (GENERAL) (ROUTINE) W/OUT ABNORMAL FINDINGS: ICD-10-CM

## 2025-04-30 PROCEDURE — 82270 OCCULT BLOOD FECES: CPT

## 2025-04-30 PROCEDURE — 99459 PELVIC EXAMINATION: CPT

## 2025-04-30 PROCEDURE — 99396 PREV VISIT EST AGE 40-64: CPT

## 2025-05-02 LAB — HPV HIGH+LOW RISK DNA PNL CVX: NOT DETECTED

## 2025-05-05 LAB — CYTOLOGY CVX/VAG DOC THIN PREP: ABNORMAL

## 2025-06-02 ENCOUNTER — APPOINTMENT (OUTPATIENT)
Dept: SURGICAL ONCOLOGY | Facility: CLINIC | Age: 50
End: 2025-06-02
Payer: COMMERCIAL

## 2025-06-02 VITALS
WEIGHT: 120 LBS | RESPIRATION RATE: 18 BRPM | BODY MASS INDEX: 18.83 KG/M2 | OXYGEN SATURATION: 99 % | HEIGHT: 67 IN | HEART RATE: 56 BPM | DIASTOLIC BLOOD PRESSURE: 75 MMHG | SYSTOLIC BLOOD PRESSURE: 113 MMHG

## 2025-06-02 DIAGNOSIS — Z91.89 OTHER SPECIFIED PERSONAL RISK FACTORS, NOT ELSEWHERE CLASSIFIED: ICD-10-CM

## 2025-06-02 PROCEDURE — 99215 OFFICE O/P EST HI 40 MIN: CPT
